# Patient Record
Sex: FEMALE | Race: BLACK OR AFRICAN AMERICAN | Employment: UNEMPLOYED | URBAN - NONMETROPOLITAN AREA
[De-identification: names, ages, dates, MRNs, and addresses within clinical notes are randomized per-mention and may not be internally consistent; named-entity substitution may affect disease eponyms.]

---

## 2024-01-27 ENCOUNTER — HOSPITAL ENCOUNTER (EMERGENCY)
Age: 29
Discharge: HOME | End: 2024-01-27
Attending: EMERGENCY MEDICINE
Payer: COMMERCIAL

## 2024-01-27 VITALS
TEMPERATURE: 98.4 F | OXYGEN SATURATION: 99 % | DIASTOLIC BLOOD PRESSURE: 83 MMHG | SYSTOLIC BLOOD PRESSURE: 125 MMHG | RESPIRATION RATE: 18 BRPM | HEART RATE: 101 BPM

## 2024-01-27 DIAGNOSIS — R10.31 ABDOMINAL PAIN, RIGHT LOWER QUADRANT: Primary | ICD-10-CM

## 2024-01-27 LAB — HCG UR QL: NEGATIVE

## 2024-01-27 PROCEDURE — 99283 EMERGENCY DEPT VISIT LOW MDM: CPT

## 2024-01-27 PROCEDURE — 81025 URINE PREGNANCY TEST: CPT

## 2024-01-27 NOTE — DISCHARGE INSTR - COC
Continuity of Care Form    Patient Name: Pierre Louis Rose Saintamene   :  1995  MRN:  [id number]    Admit date:  2024  Discharge date:  ***    Code Status Order: No Order   Advance Directives:     Admitting Physician:  No admitting provider for patient encounter.  PCP: No primary care provider on file.    Discharging Nurse: ***  Discharging Hospital Unit/Room#: E/E  Discharging Unit Phone Number: ***    Emergency Contact:   Extended Emergency Contact Information  Primary Emergency Contact: Tacho Mcknight  Mobile Phone: [phone]  Relation: Boyfriend    Past Surgical History:  No past surgical history on file.    Immunization History:     There is no immunization history on file for this patient.    Active Problems:  There is no problem list on file for this patient.      Isolation/Infection:   Isolation            No Isolation          Patient Infection Status       None to display            Nurse Assessment:  Last Vital Signs: /83   Pulse (!) 101   Temp 98.4 °F (36.9 °C)   Resp 18   LMP 2023   SpO2 99%     Last documented pain score (0-10 scale):    Last Weight:   Wt Readings from Last 1 Encounters:   No data found for Wt     Mental Status:  {IP PT MENTAL STATUS:52146}    IV Access:  { SKYE IV ACCESS:163376849}    Nursing Mobility/ADLs:  Walking   {CHP DME ADLs:844959777}  Transfer  {CHP DME ADLs:605691126}  Bathing  {CHP DME ADLs:598123789}  Dressing  {CHP DME ADLs:718356934}  Toileting  {CHP DME ADLs:717815282}  Feeding  {CHP DME ADLs:350717742}  Med Admin  {CHP DME ADLs:137651865}  Med Delivery   { SKYE MED Delivery:668312260}    Wound Care Documentation and Therapy:        Elimination:  Continence:   Bowel: {YES / NO:}  Bladder: {YES / NO:}  Urinary Catheter: {Urinary Catheter:426269959}   Colostomy/Ileostomy/Ileal Conduit: {YES / NO:}       Date of Last BM: ***  No intake or output data in the 24 hours ending 24 1244  No intake/output data  {GREATER/LESS:316966064} 30 days.     Update Admission H&P: {CHP DME Changes in HandP:399962683}    PHYSICIAN SIGNATURE:  {Esignature:704215994}

## 2024-01-27 NOTE — ED PROVIDER NOTES
PATIENT NAME: Pierre Louis Rose Saintamene  MRN: [id number]  : 1995  DONOVAN: 2024    I performed a history and physical examination of the patient and discussed management with the Resident. I reviewed the Resident's note and agree with the documented findings and plan of care. Any areas of disagreement are noted on the chart. I was personally present for the key portions of any procedures and have documented in the chart those procedures where I was not present during the key portions. I have reviewed the emergency nurses triage note and agree with the chief complaint, past medical history, past surgical history, allergies, medications, social and family history as documented unless otherwise noted below.    MEDICAL DEDISION MAKING (MDM)     Pierre Louis Rose Saintamene is a 29 y.o. female who presents to Emergency Department with No chief complaint on file.     Mild nausea and mild lower abdomen pain since today.   She wants a pregnancy test.   Urine pregnancy negative.   Discharged with PCP follow up as needed.     Vitals:    24 1040   BP: 125/83   Pulse: (!) 101   Resp: 18   Temp: 98.4 °F (36.9 °C)   SpO2: 99%     Labs Reviewed   PREGNANCY, URINE     No orders to display     Medications - No data to display  FINAL IMPRESSION AND DISPOSITION      1. Abdominal pain, right lower quadrant        DISPOSITION Decision To Discharge 2024 12:36:13 PM      PATIENT REFERRED TO:  No follow-up provider specified.  DISCHARGE MEDICATIONS:  There are no discharge medications for this patient.      (Please note that portions of this note were completed with a voice recognition program.  Efforts were made to edit the dictations but occasionally words aremis-transcribed.)    MD Micheal Enriquez Jian, MD  [phone]

## 2024-01-27 NOTE — ED PROVIDER NOTES
[facility]  EMERGENCY DEPARTMENT ENCOUNTER          Pt Name: Pierre Louis Rose Saintamene  MRN: [id number]  Birthdate 1995  Date of evaluation: 1/27/2024  ED Resident: Layton Vega DO  ED Attending: Americo Burton MD    CHIEF COMPLAINT     No chief complaint on file.    History obtained from the patient.    HISTORY OF PRESENT ILLNESS      Pierre Louis Rose Saintamene is a 29 y.o. female who presents to the emergency department for having lower abdominal pain for last 1 to 2 week. Having feeling of vomiting in the AM, umbilical pain per patient. States she is sexual active. Last period on January 8th. Periods are not usually normal, time of the month will vary but will have them every month. No pregnancy test done at home.     Has been having a headache off an on since this started a few weeks ago. Sometimes will have white vaginal discharge. Denies any pruritus or pain.     Denies any fevers or increasing chills.    Of note patient is only wanting a pregnancy test while she is in house.    PAST MEDICAL AND SURGICAL HISTORY   No past medical history on file.  No past surgical history on file.    MEDICATIONS   No current facility-administered medications for this encounter.  No current outpatient medications on file.    SOCIAL HISTORY     Social History     Social History Narrative    Not on file          ALLERGIES   No Known Allergies    FAMILY HISTORY   No family history on file.    PHYSICAL EXAM     ED Triage Vitals [01/27/24 1040]   BP Temp Temp src Pulse Respirations SpO2 Height Weight   125/83 98.4 °F (36.9 °C) -- (!) 101 18 99 % -- --       Additional Vital Signs:  Vitals:    01/27/24 1040   BP: 125/83   Pulse: (!) 101   Resp: 18   Temp: 98.4 °F (36.9 °C)   SpO2: 99%     Physical Exam  Vitals reviewed.   HENT:      Head: Normocephalic.      Right Ear: External ear normal.      Left Ear: External ear normal.   Eyes:      Conjunctiva/sclera: Conjunctivae normal.

## 2024-01-27 NOTE — DISCHARGE INSTRUCTIONS
If symptoms were to worsen including increased abdominal pain, increased fevers, please return to the emergency department.

## 2024-01-27 NOTE — ED NOTES
Patient to ED wanting a pregnacy test due to feeling nauseated with mild abd pain in the AM. Patient last menstrual cycle was December 8th

## 2024-02-09 ENCOUNTER — APPOINTMENT (OUTPATIENT)
Dept: ULTRASOUND IMAGING | Age: 29
End: 2024-02-09
Payer: COMMERCIAL

## 2024-02-09 ENCOUNTER — HOSPITAL ENCOUNTER (EMERGENCY)
Age: 29
Discharge: HOME | End: 2024-02-09
Payer: COMMERCIAL

## 2024-02-09 VITALS
SYSTOLIC BLOOD PRESSURE: 127 MMHG | RESPIRATION RATE: 16 BRPM | DIASTOLIC BLOOD PRESSURE: 92 MMHG | OXYGEN SATURATION: 99 % | HEART RATE: 94 BPM | TEMPERATURE: 97.6 F

## 2024-02-09 DIAGNOSIS — R10.2 PELVIC PAIN: Primary | ICD-10-CM

## 2024-02-09 DIAGNOSIS — Z34.90 INTRAUTERINE PREGNANCY: ICD-10-CM

## 2024-02-09 LAB
ABO: NORMAL
ANION GAP SERPL CALC-SCNC: 12 MEQ/L (ref 8–16)
BACTERIA: ABNORMAL
BASOPHILS ABSOLUTE: 0 THOU/MM3 (ref 0–0.1)
BASOPHILS NFR BLD AUTO: 0.5 %
BILIRUB UR QL STRIP: NEGATIVE
BUN SERPL-MCNC: 9 MG/DL (ref 7–22)
CALCIUM SERPL-MCNC: 9.8 MG/DL (ref 8.5–10.5)
CASTS #/AREA URNS LPF: ABNORMAL /LPF
CASTS #/AREA URNS LPF: ABNORMAL /LPF
CHARACTER UR: ABNORMAL
CHARCOAL URNS QL MICRO: ABNORMAL
CHLAMYDIA TRACHOMATIS BY RT-PCR: NOT DETECTED
CHLORIDE SERPL-SCNC: 103 MEQ/L (ref 98–111)
CO2 SERPL-SCNC: 23 MEQ/L (ref 23–33)
COLOR UR: YELLOW
CREAT SERPL-MCNC: 0.7 MG/DL (ref 0.4–1.2)
CRYSTALS URNS QL MICRO: ABNORMAL
CT/NG SOURCE: NORMAL
DEPRECATED RDW RBC AUTO: 37.7 FL (ref 35–45)
EOSINOPHIL NFR BLD AUTO: 0.6 %
EOSINOPHILS ABSOLUTE: 0 THOU/MM3 (ref 0–0.4)
EPITHELIAL CELLS, UA: ABNORMAL /HPF
ERYTHROCYTE [DISTWIDTH] IN BLOOD BY AUTOMATED COUNT: 12 % (ref 11.5–14.5)
GFR SERPL CREATININE-BSD FRML MDRD: > 60 ML/MIN/1.73M2
GLUCOSE SERPL-MCNC: 87 MG/DL (ref 70–108)
GLUCOSE UR QL STRIP.AUTO: NEGATIVE MG/DL
HCG INTACT+B SERPL-ACNC: 4413 MIU/ML (ref 0–5)
HCG UR QL: POSITIVE
HCT VFR BLD AUTO: 43.3 % (ref 37–47)
HGB BLD-MCNC: 14.4 GM/DL (ref 12–16)
HGB UR QL STRIP.AUTO: NEGATIVE
IMM GRANULOCYTES # BLD AUTO: 0.02 THOU/MM3 (ref 0–0.07)
IMM GRANULOCYTES NFR BLD AUTO: 0.3 %
KETONES UR QL STRIP.AUTO: ABNORMAL
KOH PREP SPEC: NORMAL
LEUKOCYTE ESTERASE UR QL STRIP.AUTO: NEGATIVE
LYMPHOCYTES ABSOLUTE: 1.1 THOU/MM3 (ref 1–4.8)
LYMPHOCYTES NFR BLD AUTO: 17 %
MCH RBC QN AUTO: 28.7 PG (ref 26–33)
MCHC RBC AUTO-ENTMCNC: 33.3 GM/DL (ref 32.2–35.5)
MCV RBC AUTO: 86.3 FL (ref 81–99)
MONOCYTES ABSOLUTE: 0.7 THOU/MM3 (ref 0.4–1.3)
MONOCYTES NFR BLD AUTO: 10.4 %
NEISSERIA GONORRHOEAE BY RT-PCR: NOT DETECTED
NEUTROPHILS NFR BLD AUTO: 71.2 %
NITRITE UR QL STRIP.AUTO: NEGATIVE
NRBC BLD AUTO-RTO: 0 /100 WBC
OSMOLALITY SERPL CALC.SUM OF ELEC: 273.7 MOSMOL/KG (ref 275–300)
PH UR STRIP.AUTO: 8.5 [PH] (ref 5–9)
PLATELET # BLD AUTO: 241 THOU/MM3 (ref 130–400)
PMV BLD AUTO: 11.9 FL (ref 9.4–12.4)
POTASSIUM SERPL-SCNC: 4 MEQ/L (ref 3.5–5.2)
PROT UR STRIP.AUTO-MCNC: ABNORMAL MG/DL
RBC # BLD AUTO: 5.02 MILL/MM3 (ref 4.2–5.4)
RBC #/AREA URNS HPF: ABNORMAL /HPF
RENAL EPI CELLS #/AREA URNS HPF: ABNORMAL /[HPF]
RH FACTOR: NORMAL
SEGMENTED NEUTROPHILS ABSOLUTE COUNT: 4.6 THOU/MM3 (ref 1.8–7.7)
SODIUM SERPL-SCNC: 138 MEQ/L (ref 135–145)
SP GR UR REFRACT.AUTO: 1.02 (ref 1–1.03)
TRICHOMONAS PREP: NORMAL
UROBILINOGEN UR QL STRIP.AUTO: 1 EU/DL (ref 0–1)
WBC # BLD AUTO: 6.4 THOU/MM3 (ref 4.8–10.8)
WBC #/AREA URNS HPF: ABNORMAL /HPF
YEAST LIKE FUNGI URNS QL MICRO: ABNORMAL

## 2024-02-09 PROCEDURE — 87205 SMEAR GRAM STAIN: CPT

## 2024-02-09 PROCEDURE — 86900 BLOOD TYPING SEROLOGIC ABO: CPT

## 2024-02-09 PROCEDURE — 87210 SMEAR WET MOUNT SALINE/INK: CPT

## 2024-02-09 PROCEDURE — 80048 BASIC METABOLIC PNL TOTAL CA: CPT

## 2024-02-09 PROCEDURE — 86901 BLOOD TYPING SEROLOGIC RH(D): CPT

## 2024-02-09 PROCEDURE — 76817 TRANSVAGINAL US OBSTETRIC: CPT

## 2024-02-09 PROCEDURE — 99284 EMERGENCY DEPT VISIT MOD MDM: CPT

## 2024-02-09 PROCEDURE — 81001 URINALYSIS AUTO W/SCOPE: CPT

## 2024-02-09 PROCEDURE — 36415 COLL VENOUS BLD VENIPUNCTURE: CPT

## 2024-02-09 PROCEDURE — 87591 N.GONORRHOEAE DNA AMP PROB: CPT

## 2024-02-09 PROCEDURE — 87491 CHLMYD TRACH DNA AMP PROBE: CPT

## 2024-02-09 PROCEDURE — 87070 CULTURE OTHR SPECIMN AEROBIC: CPT

## 2024-02-09 PROCEDURE — 85025 COMPLETE CBC W/AUTO DIFF WBC: CPT

## 2024-02-09 PROCEDURE — 81025 URINE PREGNANCY TEST: CPT

## 2024-02-09 PROCEDURE — 84702 CHORIONIC GONADOTROPIN TEST: CPT

## 2024-02-09 PROCEDURE — 87220 TISSUE EXAM FOR FUNGI: CPT

## 2024-02-09 NOTE — ED PROVIDER NOTES
[facility] EMERGENCY DEPT      EMERGENCY MEDICINE     Pt Name: Pierre Louis Rose Saintamene  MRN: [id number]  Birthdate 1995  Date of evaluation: 2/9/2024  Provider: SRIRAM Vang    CHIEF COMPLAINT       Chief Complaint   Patient presents with    Abdominal Pain    Pregnancy Test     HISTORY OF PRESENT ILLNESS   Pierre Louis Rose Saintamene is a pleasant 29 y.o. female who presents to the emergency department from from home, by private vehicle for evaluation of abdominal pain, pregnancy test that began ann marie 15. Patient demonstrates RLQ/LLQ pain that radiates into the back that has made it difficult to sleep, patient states pain is a 6/10. Patient denies trying anything to make the pain better. Patient confirms chills, occasional headaches, nausea, and vaginal itching. Patient denies SOB, chest pain/palpitations, vaginal discharge, vomiting diarrhea, irregular menses, and hematuria.         PASTMEDICAL HISTORY   No past medical history on file.    There is no problem list on file for this patient.    SURGICAL HISTORY     No past surgical history on file.    CURRENT MEDICATIONS       There are no discharge medications for this patient.      ALLERGIES     has No Known Allergies.    FAMILY HISTORY     has no family status information on file.        SOCIAL HISTORY          PHYSICAL EXAM       ED Triage Vitals [02/09/24 1024]   BP Temp Temp Source Pulse Respirations SpO2 Height Weight   (!) 127/92 97.6 °F (36.4 °C) Oral 94 16 99 % -- --       Additional Vital Signs:  Vitals:    02/09/24 1024   BP: (!) 127/92   Pulse: 94   Resp: 16   Temp: 97.6 °F (36.4 °C)   SpO2: 99%     Physical Exam  Constitutional:       General: She is not in acute distress.     Appearance: She is well-developed and normal weight. She is not ill-appearing, toxic-appearing or diaphoretic.   HENT:      Head: Normocephalic and atraumatic.      Mouth/Throat:      Mouth: Mucous membranes are moist.      Pharynx: Oropharynx is clear.   Eyes:       Extraocular Movements: Extraocular movements intact.   Cardiovascular:      Rate and Rhythm: Normal rate and regular rhythm.      Heart sounds: Normal heart sounds.   Pulmonary:      Effort: Pulmonary effort is normal.      Breath sounds: Normal breath sounds.   Abdominal:      General: Abdomen is flat. There is no distension.      Palpations: Abdomen is soft.      Tenderness: There is no abdominal tenderness. There is no guarding.   Skin:     General: Skin is warm and dry.   Neurological:      General: No focal deficit present.      Mental Status: She is alert.   Psychiatric:         Mood and Affect: Mood normal.         Behavior: Behavior normal.         FORMAL DIAGNOSTIC RESULTS     RADIOLOGY: Interpretation per the Radiologist below, if available at the time of this note (none if blank):    US OB TRANSVAGINAL   Final Result       1. Intrauterine gestational sac measuring 6.2 mm in size which corresponds to a gestational age of 5 weeks and 2 days and TRACI of 10/9/2024.   2. Small amount of free fluid within the pelvis.               **This report has been created using voice recognition software. It may contain minor errors which are inherent in voice recognition technology.**         Final report electronically signed by DR JEET DAVIDSON on 2/9/2024 12:29 PM          LABS: (none if blank)  Labs Reviewed   HCG, QUANTITATIVE, PREGNANCY - Abnormal; Notable for the following components:       Result Value    hCG,Beta Subunit,Qual,Serum 4413.0 (*)     All other components within normal limits   MICROSCOPIC URINALYSIS - Abnormal; Notable for the following components:    Ketones, Urine TRACE (*)     Protein, UA TRACE (*)     Character, Urine CLOUDY (*)     All other components within normal limits   OSMOLALITY - Abnormal; Notable for the following components:    Osmolality Calc 273.7 (*)     All other components within normal limits   KOH (SKIN,HAIR,NAILS)    Narrative:     Source: vaginal OB patient       Site:

## 2024-02-09 NOTE — ED TRIAGE NOTES
Pt presents to the ED with concern for pregnancy. Pt state last menstrual cycle was Jan 4. Pt states  she is having lower abdominal pain and emesis. Pt respirations are even and unlabored.

## 2024-02-11 LAB
BACTERIA GENITAL AEROBE CULT: NORMAL
GRAM STN GENITAL: NORMAL

## 2024-02-12 LAB
BACTERIA GENITAL AEROBE CULT: NORMAL
GRAM STN GENITAL: NORMAL

## 2024-02-26 ENCOUNTER — NURSE ONLY (OUTPATIENT)
Dept: LAB | Age: 29
End: 2024-02-26

## 2024-02-27 LAB — SOURCE: NORMAL

## 2024-02-28 LAB
C. TRACHOMATIS DNA,THIN PREP: NEGATIVE
N. GONORRHOEAE DNA, THIN PREP: NEGATIVE
SOURCE: NORMAL

## 2024-02-29 LAB
SOURCE: NORMAL
TRICHOMONAS VAGINALI, MOLECULAR: NEGATIVE

## 2024-03-08 LAB — CYTOLOGY THIN PREP PAP: NORMAL
